# Patient Record
Sex: MALE | URBAN - METROPOLITAN AREA
[De-identification: names, ages, dates, MRNs, and addresses within clinical notes are randomized per-mention and may not be internally consistent; named-entity substitution may affect disease eponyms.]

---

## 2019-04-22 ENCOUNTER — OFFICE VISIT (OUTPATIENT)
Dept: URBAN - METROPOLITAN AREA CLINIC 88 | Facility: CLINIC | Age: 64
End: 2019-04-22
Payer: MEDICARE

## 2019-04-22 DIAGNOSIS — H25.13 AGE-RELATED NUCLEAR CATARACT, BILATERAL: ICD-10-CM

## 2019-04-22 DIAGNOSIS — H50.9 STRABISMUS: ICD-10-CM

## 2019-04-22 DIAGNOSIS — H53.2 DIPLOPIA: ICD-10-CM

## 2019-04-22 DIAGNOSIS — H43.393 OTHER VITREOUS OPACITIES, BILATERAL: Primary | ICD-10-CM

## 2019-04-22 DIAGNOSIS — H11.153 PINGUECULA, BILATERAL: ICD-10-CM

## 2019-04-22 PROCEDURE — 99214 OFFICE O/P EST MOD 30 MIN: CPT | Performed by: OPHTHALMOLOGY

## 2019-04-22 ASSESSMENT — VISUAL ACUITY
OS: 20/20
OD: 20/20

## 2019-04-22 NOTE — IMPRESSION/PLAN
Impression: Age-related nuclear cataract, bilateral: H25.13. OU. Condition: established, stable. Plan: Discussed diagnosis in detail with patient. No treatment is required at this time. The patient will monitor vision changes and contact us with any decrease in vision.

## 2019-04-22 NOTE — IMPRESSION/PLAN
Impression: Other vitreous opacities, bilateral: H43.393. OU. Condition: stable. Plan: Discussed signs and symptoms of PVD/floaters. Patient instructed to call the office immediately if any symptoms noted.

## 2019-04-22 NOTE — IMPRESSION/PLAN
Impression: Strabismus: H50.9. Plan: Discussed diagnosis in detail with patient and patient's daughter. Patient is being referred to [facility] for evaluation and treatment.

## 2019-04-22 NOTE — IMPRESSION/PLAN
Impression: Diplopia: H53.2. Condition: unstable. Plan: I explained to the patient he needs to wear glasses (Prism) fulltime to correct his Diplopia.  Hx of Stroke